# Patient Record
Sex: MALE
[De-identification: names, ages, dates, MRNs, and addresses within clinical notes are randomized per-mention and may not be internally consistent; named-entity substitution may affect disease eponyms.]

---

## 2023-06-14 PROBLEM — Z00.00 ENCOUNTER FOR PREVENTIVE HEALTH EXAMINATION: Status: ACTIVE | Noted: 2023-06-14

## 2023-06-15 ENCOUNTER — LABORATORY RESULT (OUTPATIENT)
Age: 33
End: 2023-06-15

## 2023-06-15 ENCOUNTER — APPOINTMENT (OUTPATIENT)
Dept: UROLOGY | Facility: CLINIC | Age: 33
End: 2023-06-15
Payer: COMMERCIAL

## 2023-06-15 VITALS
RESPIRATION RATE: 16 BRPM | BODY MASS INDEX: 30.73 KG/M2 | SYSTOLIC BLOOD PRESSURE: 124 MMHG | DIASTOLIC BLOOD PRESSURE: 75 MMHG | TEMPERATURE: 98.1 F | OXYGEN SATURATION: 94 % | HEIGHT: 64 IN | WEIGHT: 180 LBS | HEART RATE: 56 BPM

## 2023-06-15 PROCEDURE — 99204 OFFICE O/P NEW MOD 45 MIN: CPT

## 2023-06-15 RX ORDER — NYSTATIN AND TRIAMCINOLONE ACETONIDE 100000; 1 MG/G; MG/G
100000-0.1 CREAM TOPICAL 3 TIMES DAILY
Qty: 30 | Refills: 0 | Status: ACTIVE | COMMUNITY
Start: 2023-06-15 | End: 1900-01-01

## 2023-06-15 NOTE — HISTORY OF PRESENT ILLNESS
[FreeTextEntry1] : Language: English\par Date of First visit: 06/14/2023 \par Accompanied by: self\par Contact info: \par Referring Provider/PCP:  \par Fax: \par \par \par CC/ Problem List:\par balanitis / phimosis\par ===============================================================================\par FIRST VISIT / Summary:\par Very pleasant 32 year old M here for balanitis and discussion of circumcision. Has had yeast infections. Had antifungal / steroid cream already for 2-3 months. Not sexually active, no tearing of the skin. \par \par -------------------------------------------------------------------------------------------\par INTERVAL VISITS:\par \par ===============================================================================\par \par PMH: none\par Meds: none\par All: fruit/nuts\par FHx: No  malignancies \par SocHx: nonsmoker, etoh 1/week, logistic associate\par \par PSH: none \par \par \par ROS: Review of Systems is as per HPI unless otherwise denoted below\par \par \par ===============================================================================\par DATA: \par \par LABS (SELECTED):---------------------------------------------------------------------------------------------------\par \par \par RADS:-------------------------------------------------------------------------------------------------------------------\par \par \par PATHOLOGY/CYTOLOGY:-------------------------------------------------------------------------------------------\par \par \par VOIDING STUDIES: ----------------------------------------------------------------------------------------------------\par \par \par STONE STUDIES: (Analysis/LLSA)----------------------------------------------------------------------------------\par \par \par PROCEDURES: -----------------------------------------------------------------------------------------------\par \par \par \par ===============================================================================\par \par PHYSICAL EXAM:\par \par GEN: AAOx3, NAD\par \par PSYCH: Appropriate Behavior, Affect Congruent\par \par Lungs: No labored breathing\par \par GAIT: Gait normal, Stability good\par \par ABD: no suprapubic or CVAT\par \par \par  FOCUSED: ----------------------------------------------------------------------------------------------------------------\par \par Date: 06/15/2023 \par Chaperone: offered and patient declined\par \par Penis: No lesions, tenderness, curvature, plaques. Uncircumcised, phimotic band with some signs consistent with BXO whitening of skin. retracts to mid glans\par \par Meatus: normal caliber\par \par =======================================================================================\par DISCUSSION: \par =======================================================================================\par ASSESSMENT and PLAN\par \par \par 1. Phimosis\par - call if desires circ\par - labs today in case he does schedule\par \par 2. balanitis\par - nystatin triamcinolone\par \par \par =======================================================================================\par \par Thank you for allowing me to assist in the care of your patient. Should you have any questions please do not hesitate to reach out to me.\par \par \par Raymundo Hoover MD                                                            \par [facility]\par [facility]\par \par [place] Office:\par [address], [address]   \[place] [address]    \par T: [phone]    \par F: [phone]    \par \par [place] Office:\par [address], [place] [address]\par T: [phone]\par F: [phone]

## 2023-06-16 LAB
ANION GAP SERPL CALC-SCNC: 12 MMOL/L
BUN SERPL-MCNC: 22 MG/DL
CALCIUM SERPL-MCNC: 9.7 MG/DL
CHLORIDE SERPL-SCNC: 103 MMOL/L
CO2 SERPL-SCNC: 24 MMOL/L
CREAT SERPL-MCNC: 1.17 MG/DL
EGFR: 86 ML/MIN/1.73M2
GLUCOSE SERPL-MCNC: 98 MG/DL
INR PPP: 0.99 RATIO
POTASSIUM SERPL-SCNC: 4.5 MMOL/L
PT BLD: 11.6 SEC
SODIUM SERPL-SCNC: 139 MMOL/L

## 2023-07-03 ENCOUNTER — APPOINTMENT (OUTPATIENT)
Dept: UROLOGY | Facility: CLINIC | Age: 33
End: 2023-07-03
Payer: COMMERCIAL

## 2023-07-03 VITALS
HEART RATE: 62 BPM | RESPIRATION RATE: 16 BRPM | OXYGEN SATURATION: 97 % | TEMPERATURE: 97.7 F | BODY MASS INDEX: 30.73 KG/M2 | WEIGHT: 180 LBS | SYSTOLIC BLOOD PRESSURE: 120 MMHG | DIASTOLIC BLOOD PRESSURE: 80 MMHG | HEIGHT: 64 IN

## 2023-07-03 DIAGNOSIS — N48.1 BALANITIS: ICD-10-CM

## 2023-07-03 PROCEDURE — 54161 CIRCUM 28 DAYS OR OLDER: CPT

## 2023-07-03 RX ORDER — BACITRACIN ZINC 500 [USP'U]/G
500 OINTMENT TOPICAL 3 TIMES DAILY
Qty: 1 | Refills: 0 | Status: ACTIVE | COMMUNITY
Start: 2023-07-03 | End: 1900-01-01

## 2023-07-12 ENCOUNTER — NON-APPOINTMENT (OUTPATIENT)
Age: 33
End: 2023-07-12

## 2023-07-12 LAB — CORE LAB BIOPSY: NORMAL

## 2023-08-01 ENCOUNTER — APPOINTMENT (OUTPATIENT)
Dept: UROLOGY | Facility: CLINIC | Age: 33
End: 2023-08-01
Payer: COMMERCIAL

## 2023-08-01 PROCEDURE — 99213 OFFICE O/P EST LOW 20 MIN: CPT

## 2023-08-08 NOTE — HISTORY OF PRESENT ILLNESS
[FreeTextEntry1] : Language: English Date of First visit: 06/14/2023 Accompanied by: self Contact info: Referring Provider/PCP:  Fax:  CC/ Problem List: balanitis / phimosis s/p circumcision  ===============================================================================  FIRST VISIT / Summary:  Very pleasant 32 year old M here for balanitis and discussion of circumcision. Has had yeast infections. Had antifungal / steroid cream already for 2-3 months. Not sexually active, no tearing of the skin. ------------------------------------------------------------------------------------------- INTERVAL VISITS: The patient's medications and allergies were reviewed and edited below. Dated 08/01/2023  He has had no issues post-circumcision  ===============================================================================   PMH: none Meds: none All: fruit/nuts FHx: No  malignancies SocHx: nonsmoker, etoh 1/week, logistic associate  PSH: none  ROS: Review of Systems is as per HPI unless otherwise denoted below  ===============================================================================  DATA: LABS (SELECTED):---------------------------------------------------------------------------------------------------   RADS:-------------------------------------------------------------------------------------------------------------------   PATHOLOGY/CYTOLOGY:------------------------------------------------------------------------------------------- 7/3/23: Circumcision: benign  VOIDING STUDIES: ----------------------------------------------------------------------------------------------------   STONE STUDIES: (Analysis/LLSA)----------------------------------------------------------------------------------   PROCEDURES: -----------------------------------------------------------------------------------------------  =============================================================================== PHYSICAL EXAM:    FOCUSED: ----------------------------------------------------------------------------------------------------------------    Date: 06/15/2023  Chaperone: offered and patient declined Penis: No lesions, tenderness, curvature, plaques. Uncircumcised, phimotic band with some signs consistent with BXO whitening of skin. retracts to mid glans Meatus: normal caliber  ======================================================================================  DISCUSSION:  =======================================================================================  ASSESSMENT and PLAN   1. Phimosis - s/p circ 7/3/23 doing well - no further follow up required - resume sexual activity at 6 weeks gradually    ======================================================================================= The total time personally spent preparing for this visit (reviewing test results, obtaining external history) and during the visit (ordering tests/medications, spent face to face with the patient / family and counseling them on the above), as well as after the visit (on clinical documentation and coordination with other care providers) was approximately 20 minutes.  Thank you for allowing me to assist in the care of your patient. Should you have any questions please do not hesitate to reach out to me.   Raymundo Hoover MD                                                             [facility]  [place] Office: [address], [address]    [place] [address]     T: [phone]     F: [phone]      [place] Office: [address] [place] [address], 1st [place] [address] T: [phone] F: [phone]